# Patient Record
Sex: MALE | Race: WHITE
[De-identification: names, ages, dates, MRNs, and addresses within clinical notes are randomized per-mention and may not be internally consistent; named-entity substitution may affect disease eponyms.]

---

## 2022-03-18 PROBLEM — F90.2 ATTENTION DEFICIT HYPERACTIVITY DISORDER (ADHD), COMBINED TYPE: Status: ACTIVE | Noted: 2021-02-05

## 2022-03-19 PROBLEM — J45.40 MODERATE PERSISTENT ASTHMA WITHOUT COMPLICATION: Status: ACTIVE | Noted: 2021-02-05

## 2022-03-19 PROBLEM — F31.75 BIPOLAR DISORDER, IN PARTIAL REMISSION, MOST RECENT EPISODE DEPRESSED (HCC): Status: ACTIVE | Noted: 2021-03-11

## 2022-07-13 ENCOUNTER — TELEMEDICINE (OUTPATIENT)
Dept: INTERNAL MEDICINE CLINIC | Facility: CLINIC | Age: 27
End: 2022-07-13
Payer: COMMERCIAL

## 2022-07-13 DIAGNOSIS — F90.2 ATTENTION DEFICIT HYPERACTIVITY DISORDER (ADHD), COMBINED TYPE: ICD-10-CM

## 2022-07-13 DIAGNOSIS — U07.1 COVID: ICD-10-CM

## 2022-07-13 DIAGNOSIS — G47.9 SLEEP DISORDER: ICD-10-CM

## 2022-07-13 DIAGNOSIS — F31.75 BIPOLAR DISORDER, IN PARTIAL REMISSION, MOST RECENT EPISODE DEPRESSED (HCC): ICD-10-CM

## 2022-07-13 DIAGNOSIS — B35.9 RINGWORM: ICD-10-CM

## 2022-07-13 DIAGNOSIS — J45.40 MODERATE PERSISTENT ASTHMA WITHOUT COMPLICATION: Primary | ICD-10-CM

## 2022-07-13 PROCEDURE — 99214 OFFICE O/P EST MOD 30 MIN: CPT | Performed by: INTERNAL MEDICINE

## 2022-07-13 RX ORDER — KETOCONAZOLE 20 MG/G
CREAM TOPICAL
Qty: 30 G | Refills: 1 | Status: SHIPPED | OUTPATIENT
Start: 2022-07-13

## 2022-07-13 ASSESSMENT — ENCOUNTER SYMPTOMS
GASTROINTESTINAL NEGATIVE: 1
RESPIRATORY NEGATIVE: 1

## 2022-07-13 NOTE — LETTER
Lynn Roy MD   [facility] INTERNAL MEDICINE  [address]  [facility] [address]  Phone: [phone]  Fax: [phone]        07/13/22     Graeme Client   1995   [address] [place] [address]     To Whom It May Concern:      Graeme Client is symptom free for more then 5 days and is fine to return to work. With his permission I am able to disclose he had COVID. At times can still test positive after the infection. He has no fever, cough or other symptoms for over 6 days. Per CDC guidelines can return to work symptom free after 5 days.            Sincerely,    Lynn Roy MD

## 2022-07-13 NOTE — PROGRESS NOTES
FOLLOW UP VISIT    Subjective:    Mauricio Hernandez (: 1995) is a 27 y.o., adult,   Chief Complaint   Patient presents with    Follow-up       HPI:  Very nice patient in for follow up. He tested positive for COVID 3 weeks ago. He informed his manager and stated he was to come back 5 days after he is symptom free. He states he is symptom free and feels fine and is wanting to return to work. He also recently rescued a kitten and has ringworm. He also needs sleep study         The following portions of the patient's history were reviewed and updated as appropriate:      History reviewed. No pertinent past medical history. History reviewed. No pertinent surgical history. History reviewed. No pertinent family history. Social History     Socioeconomic History    Marital status: Life Partner     Spouse name: Not on file    Number of children: Not on file    Years of education: Not on file    Highest education level: Not on file   Occupational History    Not on file   Tobacco Use    Smoking status: Never Smoker    Smokeless tobacco: Never Used   Substance and Sexual Activity    Alcohol use: Not on file    Drug use: Not on file    Sexual activity: Not on file   Other Topics Concern    Not on file   Social History Narrative    Not on file     Social Determinants of Health     Financial Resource Strain:     Difficulty of Paying Living Expenses: Not on file   Food Insecurity:     Worried About Running Out of Food in the Last Year: Not on file    Oscar of Food in the Last Year: Not on file   Transportation Needs:     Lack of Transportation (Medical): Not on file    Lack of Transportation (Non-Medical):  Not on file   Physical Activity:     Days of Exercise per Week: Not on file    Minutes of Exercise per Session: Not on file   Stress:     Feeling of Stress : Not on file   Social Connections:     Frequency of Communication with Friends and Family: Not on file    Frequency of Social verified. This visit was conducted pursuant to the emergency declaration under the [facility], [address] [place] [address] authority and the [employer] General Act. A caregiver was present when appropriate. Ability to conduct physical exam was limited. The patient was located at home in a state where the provider was licensed to provide care. The patient and/or patient representative voiced understanding and agreement with the current diagnoses, recommendations, and possible side effects. No follow-up provider specified.       Devon White MD

## 2022-12-15 ENCOUNTER — HOSPITAL ENCOUNTER (OUTPATIENT)
Dept: ULTRASOUND IMAGING | Age: 27
Discharge: HOME | End: 2022-12-18

## 2022-12-15 ENCOUNTER — OFFICE VISIT (OUTPATIENT)
Dept: INTERNAL MEDICINE CLINIC | Facility: CLINIC | Age: 27
End: 2022-12-15
Payer: COMMERCIAL

## 2022-12-15 VITALS
WEIGHT: 165.8 LBS | HEART RATE: 75 BPM | HEIGHT: 66 IN | OXYGEN SATURATION: 99 % | TEMPERATURE: 98.5 F | BODY MASS INDEX: 26.65 KG/M2 | DIASTOLIC BLOOD PRESSURE: 68 MMHG | SYSTOLIC BLOOD PRESSURE: 94 MMHG

## 2022-12-15 DIAGNOSIS — N50.812 PAIN IN LEFT TESTICLE: ICD-10-CM

## 2022-12-15 DIAGNOSIS — G47.9 SLEEP DISORDER: ICD-10-CM

## 2022-12-15 DIAGNOSIS — R10.9 ABDOMINAL DISCOMFORT: Primary | ICD-10-CM

## 2022-12-15 DIAGNOSIS — Z00.00 ANNUAL PHYSICAL EXAM: ICD-10-CM

## 2022-12-15 DIAGNOSIS — Q99.9 GENETIC DEFECT: ICD-10-CM

## 2022-12-15 DIAGNOSIS — N50.819 TESTICULAR DISCOMFORT: ICD-10-CM

## 2022-12-15 PROCEDURE — 99395 PREV VISIT EST AGE 18-39: CPT | Performed by: INTERNAL MEDICINE

## 2022-12-15 PROCEDURE — 99214 OFFICE O/P EST MOD 30 MIN: CPT | Performed by: INTERNAL MEDICINE

## 2022-12-15 RX ORDER — LAMOTRIGINE 100 MG/1
1 TABLET ORAL
COMMUNITY
Start: 2022-11-07

## 2022-12-15 RX ORDER — DEXTROAMPHETAMINE SACCHARATE, AMPHETAMINE ASPARTATE, DEXTROAMPHETAMINE SULFATE AND AMPHETAMINE SULFATE 1.25; 1.25; 1.25; 1.25 MG/1; MG/1; MG/1; MG/1
TABLET ORAL
COMMUNITY
Start: 2022-11-15

## 2022-12-15 RX ORDER — ALBUTEROL SULFATE 90 UG/1
2 AEROSOL, METERED RESPIRATORY (INHALATION) EVERY 6 HOURS PRN
COMMUNITY
Start: 2022-03-02 | End: 2023-03-02

## 2022-12-15 ASSESSMENT — PATIENT HEALTH QUESTIONNAIRE - PHQ9
2. FEELING DOWN, DEPRESSED OR HOPELESS: 3
3. TROUBLE FALLING OR STAYING ASLEEP: 2
5. POOR APPETITE OR OVEREATING: 2
SUM OF ALL RESPONSES TO PHQ9 QUESTIONS 1 & 2: 5
SUM OF ALL RESPONSES TO PHQ QUESTIONS 1-9: 15
7. TROUBLE CONCENTRATING ON THINGS, SUCH AS READING THE NEWSPAPER OR WATCHING TELEVISION: 2
SUM OF ALL RESPONSES TO PHQ QUESTIONS 1-9: 15
6. FEELING BAD ABOUT YOURSELF - OR THAT YOU ARE A FAILURE OR HAVE LET YOURSELF OR YOUR FAMILY DOWN: 0
SUM OF ALL RESPONSES TO PHQ QUESTIONS 1-9: 15
9. THOUGHTS THAT YOU WOULD BE BETTER OFF DEAD, OR OF HURTING YOURSELF: 0
8. MOVING OR SPEAKING SO SLOWLY THAT OTHER PEOPLE COULD HAVE NOTICED. OR THE OPPOSITE, BEING SO FIGETY OR RESTLESS THAT YOU HAVE BEEN MOVING AROUND A LOT MORE THAN USUAL: 3
4. FEELING TIRED OR HAVING LITTLE ENERGY: 1
1. LITTLE INTEREST OR PLEASURE IN DOING THINGS: 2
SUM OF ALL RESPONSES TO PHQ QUESTIONS 1-9: 15
10. IF YOU CHECKED OFF ANY PROBLEMS, HOW DIFFICULT HAVE THESE PROBLEMS MADE IT FOR YOU TO DO YOUR WORK, TAKE CARE OF THINGS AT HOME, OR GET ALONG WITH OTHER PEOPLE: 2

## 2022-12-15 ASSESSMENT — ENCOUNTER SYMPTOMS
CONSTIPATION: 0
CHEST TIGHTNESS: 0
SHORTNESS OF BREATH: 0
BACK PAIN: 0
RHINORRHEA: 0
ABDOMINAL PAIN: 0
WHEEZING: 0
EYE REDNESS: 0
EYE DISCHARGE: 0
RESPIRATORY NEGATIVE: 1
SINUS PRESSURE: 0
EYE ITCHING: 0
ABDOMINAL DISTENTION: 0
DIARRHEA: 0
NAUSEA: 0
RECTAL PAIN: 0
CHOKING: 0
ALLERGIC/IMMUNOLOGIC NEGATIVE: 1
BLOOD IN STOOL: 0
COLOR CHANGE: 0
STRIDOR: 0
COUGH: 0
GASTROINTESTINAL NEGATIVE: 1
EYE PAIN: 0
EYES NEGATIVE: 1
ANAL BLEEDING: 0
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
## 2023-02-06 ENCOUNTER — OFFICE VISIT (OUTPATIENT)
Dept: INTERNAL MEDICINE CLINIC | Facility: CLINIC | Age: 28
End: 2023-02-06
Payer: COMMERCIAL

## 2023-02-06 VITALS
BODY MASS INDEX: 26.87 KG/M2 | WEIGHT: 167.2 LBS | OXYGEN SATURATION: 98 % | SYSTOLIC BLOOD PRESSURE: 118 MMHG | HEIGHT: 66 IN | HEART RATE: 55 BPM | DIASTOLIC BLOOD PRESSURE: 72 MMHG

## 2023-02-06 DIAGNOSIS — F31.75 BIPOLAR DISORDER, IN PARTIAL REMISSION, MOST RECENT EPISODE DEPRESSED (HCC): ICD-10-CM

## 2023-02-06 DIAGNOSIS — M54.9 BACK PAIN, UNSPECIFIED BACK LOCATION, UNSPECIFIED BACK PAIN LATERALITY, UNSPECIFIED CHRONICITY: Primary | ICD-10-CM

## 2023-02-06 PROCEDURE — 99214 OFFICE O/P EST MOD 30 MIN: CPT | Performed by: INTERNAL MEDICINE

## 2023-02-06 RX ORDER — CARIPRAZINE 1.5 MG/1
1.5 CAPSULE, GELATIN COATED ORAL DAILY
COMMUNITY
Start: 2023-01-27

## 2023-02-06 RX ORDER — ALBUTEROL SULFATE 90 UG/1
2 AEROSOL, METERED RESPIRATORY (INHALATION) EVERY 6 HOURS PRN
Qty: 18 G | Refills: 2 | Status: SHIPPED | OUTPATIENT
Start: 2023-02-06 | End: 2024-02-06

## 2023-02-06 ASSESSMENT — ENCOUNTER SYMPTOMS
ABDOMINAL PAIN: 1
RESPIRATORY NEGATIVE: 1
BACK PAIN: 1

## 2023-02-06 ASSESSMENT — PATIENT HEALTH QUESTIONNAIRE - PHQ9
10. IF YOU CHECKED OFF ANY PROBLEMS, HOW DIFFICULT HAVE THESE PROBLEMS MADE IT FOR YOU TO DO YOUR WORK, TAKE CARE OF THINGS AT HOME, OR GET ALONG WITH OTHER PEOPLE: 1
SUM OF ALL RESPONSES TO PHQ QUESTIONS 1-9: 14
3. TROUBLE FALLING OR STAYING ASLEEP: 1
SUM OF ALL RESPONSES TO PHQ QUESTIONS 1-9: 14
7. TROUBLE CONCENTRATING ON THINGS, SUCH AS READING THE NEWSPAPER OR WATCHING TELEVISION: 3
4. FEELING TIRED OR HAVING LITTLE ENERGY: 1
SUM OF ALL RESPONSES TO PHQ QUESTIONS 1-9: 13
1. LITTLE INTEREST OR PLEASURE IN DOING THINGS: 1
5. POOR APPETITE OR OVEREATING: 3
6. FEELING BAD ABOUT YOURSELF - OR THAT YOU ARE A FAILURE OR HAVE LET YOURSELF OR YOUR FAMILY DOWN: 3
8. MOVING OR SPEAKING SO SLOWLY THAT OTHER PEOPLE COULD HAVE NOTICED. OR THE OPPOSITE, BEING SO FIGETY OR RESTLESS THAT YOU HAVE BEEN MOVING AROUND A LOT MORE THAN USUAL: 0
SUM OF ALL RESPONSES TO PHQ9 QUESTIONS 1 & 2: 2
SUM OF ALL RESPONSES TO PHQ QUESTIONS 1-9: 14
9. THOUGHTS THAT YOU WOULD BE BETTER OFF DEAD, OR OF HURTING YOURSELF: 1
2. FEELING DOWN, DEPRESSED OR HOPELESS: 1
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
## 2024-01-03 ENCOUNTER — TELEMEDICINE (OUTPATIENT)
Dept: PRIMARY CARE CLINIC | Facility: CLINIC | Age: 29
End: 2024-01-03
Payer: COMMERCIAL

## 2024-01-03 DIAGNOSIS — R10.84 GENERALIZED ABDOMINAL PAIN: ICD-10-CM

## 2024-01-03 DIAGNOSIS — R10.84 GENERALIZED ABDOMINAL PAIN: Primary | ICD-10-CM

## 2024-01-03 LAB
ALBUMIN SERPL-MCNC: 4.3 G/DL (ref 3.5–5)
ALBUMIN/GLOB SERPL: 1.8 (ref 0.4–1.6)
ALP SERPL-CCNC: 70 U/L (ref 50–136)
ALT SERPL-CCNC: 54 U/L (ref 12–65)
ANION GAP SERPL CALC-SCNC: 1 MMOL/L (ref 2–11)
AST SERPL-CCNC: 27 U/L (ref 15–37)
BASOPHILS # BLD: 0 K/UL (ref 0–0.2)
BASOPHILS NFR BLD: 1 % (ref 0–2)
BILIRUB SERPL-MCNC: 0.5 MG/DL (ref 0.2–1.1)
BUN SERPL-MCNC: 13 MG/DL (ref 6–23)
CALCIUM SERPL-MCNC: 9.2 MG/DL (ref 8.3–10.4)
CHLORIDE SERPL-SCNC: 110 MMOL/L (ref 103–113)
CO2 SERPL-SCNC: 28 MMOL/L (ref 21–32)
CREAT SERPL-MCNC: 0.8 MG/DL (ref 0.8–1.5)
DIFFERENTIAL METHOD BLD: NORMAL
EOSINOPHIL # BLD: 0.3 K/UL (ref 0–0.8)
EOSINOPHIL NFR BLD: 6 % (ref 0.5–7.8)
ERYTHROCYTE [DISTWIDTH] IN BLOOD BY AUTOMATED COUNT: 11.9 % (ref 11.9–14.6)
GLOBULIN SER CALC-MCNC: 2.4 G/DL (ref 2.8–4.5)
GLUCOSE SERPL-MCNC: 78 MG/DL (ref 65–100)
HCT VFR BLD AUTO: 45.1 % (ref 41.1–50.3)
HGB BLD-MCNC: 15.4 G/DL (ref 13.6–17.2)
IMM GRANULOCYTES # BLD AUTO: 0 K/UL (ref 0–0.5)
IMM GRANULOCYTES NFR BLD AUTO: 0 % (ref 0–5)
LIPASE SERPL-CCNC: 702 U/L (ref 73–393)
LYMPHOCYTES # BLD: 1.9 K/UL (ref 0.5–4.6)
LYMPHOCYTES NFR BLD: 39 % (ref 13–44)
MCH RBC QN AUTO: 32.9 PG (ref 26.1–32.9)
MCHC RBC AUTO-ENTMCNC: 34.1 G/DL (ref 31.4–35)
MCV RBC AUTO: 96.4 FL (ref 82–102)
MONOCYTES # BLD: 0.5 K/UL (ref 0.1–1.3)
MONOCYTES NFR BLD: 10 % (ref 4–12)
NEUTS SEG # BLD: 2.1 K/UL (ref 1.7–8.2)
NEUTS SEG NFR BLD: 44 % (ref 43–78)
NRBC # BLD: 0 K/UL (ref 0–0.2)
PLATELET # BLD AUTO: 245 K/UL (ref 150–450)
PMV BLD AUTO: 11.5 FL (ref 9.4–12.3)
POTASSIUM SERPL-SCNC: 4.3 MMOL/L (ref 3.5–5.1)
PROT SERPL-MCNC: 6.7 G/DL (ref 6.3–8.2)
RBC # BLD AUTO: 4.68 M/UL (ref 4.23–5.6)
SODIUM SERPL-SCNC: 139 MMOL/L (ref 136–146)
WBC # BLD AUTO: 4.8 K/UL (ref 4.3–11.1)

## 2024-01-03 PROCEDURE — 99214 OFFICE O/P EST MOD 30 MIN: CPT | Performed by: INTERNAL MEDICINE

## 2024-01-03 SDOH — ECONOMIC STABILITY: FOOD INSECURITY: WITHIN THE PAST 12 MONTHS, YOU WORRIED THAT YOUR FOOD WOULD RUN OUT BEFORE YOU GOT MONEY TO BUY MORE.: SOMETIMES TRUE

## 2024-01-03 SDOH — ECONOMIC STABILITY: TRANSPORTATION INSECURITY
IN THE PAST 12 MONTHS, HAS LACK OF TRANSPORTATION KEPT YOU FROM MEETINGS, WORK, OR FROM GETTING THINGS NEEDED FOR DAILY LIVING?: NO

## 2024-01-03 SDOH — ECONOMIC STABILITY: FOOD INSECURITY: WITHIN THE PAST 12 MONTHS, THE FOOD YOU BOUGHT JUST DIDN'T LAST AND YOU DIDN'T HAVE MONEY TO GET MORE.: NEVER TRUE

## 2024-01-03 SDOH — ECONOMIC STABILITY: INCOME INSECURITY: HOW HARD IS IT FOR YOU TO PAY FOR THE VERY BASICS LIKE FOOD, HOUSING, MEDICAL CARE, AND HEATING?: NOT VERY HARD

## 2024-01-03 SDOH — ECONOMIC STABILITY: HOUSING INSECURITY
IN THE LAST 12 MONTHS, WAS THERE A TIME WHEN YOU DID NOT HAVE A STEADY PLACE TO SLEEP OR SLEPT IN A SHELTER (INCLUDING NOW)?: NO

## 2024-01-03 ASSESSMENT — ENCOUNTER SYMPTOMS
CONSTIPATION: 0
ABDOMINAL PAIN: 1
NAUSEA: 0
FLATUS: 0
HEMATOCHEZIA: 0
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
## 2024-01-04 ENCOUNTER — HOSPITAL ENCOUNTER (EMERGENCY)
Age: 29
Discharge: HOME | End: 2024-01-04
Payer: COMMERCIAL

## 2024-01-04 ENCOUNTER — TELEPHONE (OUTPATIENT)
Dept: PRIMARY CARE CLINIC | Facility: CLINIC | Age: 29
End: 2024-01-04

## 2024-01-04 ENCOUNTER — APPOINTMENT (OUTPATIENT)
Dept: CT IMAGING | Age: 29
End: 2024-01-04
Payer: COMMERCIAL

## 2024-01-04 VITALS
WEIGHT: 171 LBS | OXYGEN SATURATION: 98 % | TEMPERATURE: 98.4 F | SYSTOLIC BLOOD PRESSURE: 104 MMHG | BODY MASS INDEX: 27.48 KG/M2 | RESPIRATION RATE: 18 BRPM | HEIGHT: 66 IN | DIASTOLIC BLOOD PRESSURE: 58 MMHG | HEART RATE: 54 BPM

## 2024-01-04 DIAGNOSIS — R10.10 PAIN OF UPPER ABDOMEN: Primary | ICD-10-CM

## 2024-01-04 DIAGNOSIS — R11.2 NAUSEA AND VOMITING, UNSPECIFIED VOMITING TYPE: ICD-10-CM

## 2024-01-04 LAB
ALBUMIN SERPL-MCNC: 4.4 G/DL (ref 3.5–5)
ALBUMIN/GLOB SERPL: 1.5 (ref 0.4–1.6)
ALP SERPL-CCNC: 77 U/L (ref 45–117)
ALT SERPL-CCNC: 38 U/L (ref 13–61)
ANION GAP SERPL CALC-SCNC: 11 MMOL/L (ref 2–11)
APPEARANCE UR: CLEAR
AST SERPL-CCNC: 29 U/L (ref 15–37)
BASOPHILS # BLD: 0.1 K/UL (ref 0–0.2)
BASOPHILS NFR BLD: 1 % (ref 0–2)
BILIRUB SERPL-MCNC: 0.5 MG/DL (ref 0.2–1.1)
BILIRUB UR QL: NEGATIVE
BUN SERPL-MCNC: 13 MG/DL (ref 6–23)
CALCIUM SERPL-MCNC: 9.3 MG/DL (ref 8.3–10.4)
CHLORIDE SERPL-SCNC: 103 MMOL/L (ref 98–107)
CO2 SERPL-SCNC: 24 MMOL/L (ref 21–32)
COLOR UR: YELLOW
CREAT SERPL-MCNC: 0.85 MG/DL (ref 0.8–1.5)
DIFFERENTIAL METHOD BLD: ABNORMAL
EOSINOPHIL # BLD: 0.2 K/UL (ref 0–0.8)
EOSINOPHIL NFR BLD: 2 % (ref 0.5–7.8)
ERYTHROCYTE [DISTWIDTH] IN BLOOD BY AUTOMATED COUNT: 11.6 % (ref 11.9–14.6)
GLOBULIN SER CALC-MCNC: 3 G/DL (ref 2.8–4.5)
GLUCOSE SERPL-MCNC: 104 MG/DL (ref 65–100)
GLUCOSE UR STRIP.AUTO-MCNC: NEGATIVE MG/DL
HCT VFR BLD AUTO: 44 % (ref 41.1–50.3)
HGB BLD-MCNC: 15.7 G/DL (ref 13.6–17.2)
HGB UR QL STRIP: NEGATIVE
IMM GRANULOCYTES # BLD AUTO: 0 K/UL (ref 0–0.5)
IMM GRANULOCYTES NFR BLD AUTO: 0 % (ref 0–5)
KETONES UR QL STRIP.AUTO: NEGATIVE MG/DL
LEUKOCYTE ESTERASE UR QL STRIP.AUTO: NEGATIVE
LIPASE SERPL-CCNC: 38 U/L (ref 13–60)
LYMPHOCYTES # BLD: 2 K/UL (ref 0.5–4.6)
LYMPHOCYTES NFR BLD: 25 % (ref 13–44)
MCH RBC QN AUTO: 33.3 PG (ref 26.1–32.9)
MCHC RBC AUTO-ENTMCNC: 35.7 G/DL (ref 31.4–35)
MCV RBC AUTO: 93.4 FL (ref 82–102)
MONOCYTES # BLD: 0.6 K/UL (ref 0.1–1.3)
MONOCYTES NFR BLD: 7 % (ref 4–12)
NEUTS SEG # BLD: 5.2 K/UL (ref 1.7–8.2)
NEUTS SEG NFR BLD: 64 % (ref 43–78)
NITRITE UR QL STRIP.AUTO: NEGATIVE
NRBC # BLD: 0 K/UL (ref 0–0.2)
PH UR STRIP: 6.5 (ref 5–9)
PLATELET # BLD AUTO: 267 K/UL (ref 150–450)
PMV BLD AUTO: 11.1 FL (ref 9.4–12.3)
POTASSIUM SERPL-SCNC: 4.3 MMOL/L (ref 3.5–5.1)
PROT SERPL-MCNC: 7.4 G/DL (ref 6.4–8.2)
PROT UR STRIP-MCNC: NEGATIVE MG/DL
RBC # BLD AUTO: 4.71 M/UL (ref 4.23–5.6)
SODIUM SERPL-SCNC: 138 MMOL/L (ref 133–143)
SP GR UR REFRACTOMETRY: 1.02 (ref 1–1.02)
UREA BREATH TEST QL: NEGATIVE
UROBILINOGEN UR QL STRIP.AUTO: 0.2 EU/DL (ref 0.2–1)
WBC # BLD AUTO: 8.1 K/UL (ref 4.3–11.1)

## 2024-01-04 PROCEDURE — 6360000002 HC RX W HCPCS: Performed by: PHYSICIAN ASSISTANT

## 2024-01-04 PROCEDURE — 6360000004 HC RX CONTRAST MEDICATION: Performed by: PHYSICIAN ASSISTANT

## 2024-01-04 PROCEDURE — 96375 TX/PRO/DX INJ NEW DRUG ADDON: CPT

## 2024-01-04 PROCEDURE — 85025 COMPLETE CBC W/AUTO DIFF WBC: CPT

## 2024-01-04 PROCEDURE — 83690 ASSAY OF LIPASE: CPT

## 2024-01-04 PROCEDURE — A4216 STERILE WATER/SALINE, 10 ML: HCPCS | Performed by: PHYSICIAN ASSISTANT

## 2024-01-04 PROCEDURE — 96374 THER/PROPH/DIAG INJ IV PUSH: CPT

## 2024-01-04 PROCEDURE — 80053 COMPREHEN METABOLIC PANEL: CPT

## 2024-01-04 PROCEDURE — 81003 URINALYSIS AUTO W/O SCOPE: CPT

## 2024-01-04 PROCEDURE — 74177 CT ABD & PELVIS W/CONTRAST: CPT

## 2024-01-04 PROCEDURE — 99285 EMERGENCY DEPT VISIT HI MDM: CPT

## 2024-01-04 PROCEDURE — 6370000000 HC RX 637 (ALT 250 FOR IP)

## 2024-01-04 PROCEDURE — 6360000002 HC RX W HCPCS

## 2024-01-04 PROCEDURE — 2580000003 HC RX 258: Performed by: PHYSICIAN ASSISTANT

## 2024-01-04 PROCEDURE — 2500000003 HC RX 250 WO HCPCS: Performed by: PHYSICIAN ASSISTANT

## 2024-01-04 RX ORDER — ONDANSETRON 2 MG/ML
4 INJECTION INTRAMUSCULAR; INTRAVENOUS
Status: COMPLETED | OUTPATIENT
Start: 2024-01-04 | End: 2024-01-04

## 2024-01-04 RX ORDER — ONDANSETRON 4 MG/1
4 TABLET, FILM COATED ORAL EVERY 8 HOURS PRN
Qty: 21 TABLET | Refills: 0 | Status: SHIPPED | OUTPATIENT
Start: 2024-01-04 | End: 2024-01-11

## 2024-01-04 RX ORDER — PANTOPRAZOLE SODIUM 40 MG/1
40 TABLET, DELAYED RELEASE ORAL
Qty: 30 TABLET | Refills: 0 | Status: SHIPPED | OUTPATIENT
Start: 2024-01-04

## 2024-01-04 RX ORDER — MAGNESIUM HYDROXIDE/ALUMINUM HYDROXICE/SIMETHICONE 120; 1200; 1200 MG/30ML; MG/30ML; MG/30ML
30 SUSPENSION ORAL
Status: COMPLETED | OUTPATIENT
Start: 2024-01-04 | End: 2024-01-04

## 2024-01-04 RX ORDER — SUCRALFATE 1 G/1
1 TABLET ORAL 4 TIMES DAILY
Qty: 28 TABLET | Refills: 0 | Status: SHIPPED | OUTPATIENT
Start: 2024-01-04 | End: 2024-01-11

## 2024-01-04 RX ORDER — DICYCLOMINE HYDROCHLORIDE 10 MG/1
CAPSULE ORAL
Status: COMPLETED
Start: 2024-01-04 | End: 2024-01-04

## 2024-01-04 RX ORDER — DICYCLOMINE HCL 20 MG
20 TABLET ORAL 4 TIMES DAILY PRN
Qty: 28 TABLET | Refills: 0 | Status: SHIPPED | OUTPATIENT
Start: 2024-01-04 | End: 2024-01-11

## 2024-01-04 RX ORDER — SODIUM CHLORIDE 9 MG/ML
INJECTION INTRAVENOUS
Status: DISCONTINUED
Start: 2024-01-04 | End: 2024-01-04 | Stop reason: HOSPADM

## 2024-01-04 RX ORDER — MORPHINE SULFATE 4 MG/ML
4 INJECTION INTRAVENOUS ONCE
Status: COMPLETED | OUTPATIENT
Start: 2024-01-04 | End: 2024-01-04

## 2024-01-04 RX ORDER — 0.9 % SODIUM CHLORIDE 0.9 %
1000 INTRAVENOUS SOLUTION INTRAVENOUS
Status: COMPLETED | OUTPATIENT
Start: 2024-01-04 | End: 2024-01-04

## 2024-01-04 RX ORDER — LIDOCAINE HYDROCHLORIDE 20 MG/ML
SOLUTION OROPHARYNGEAL
Status: COMPLETED
Start: 2024-01-04 | End: 2024-01-04

## 2024-01-04 RX ORDER — KETOROLAC TROMETHAMINE 30 MG/ML
INJECTION, SOLUTION INTRAMUSCULAR; INTRAVENOUS
Status: COMPLETED
Start: 2024-01-04 | End: 2024-01-04

## 2024-01-04 RX ORDER — FAMOTIDINE 10 MG/ML
INJECTION, SOLUTION INTRAVENOUS
Status: DISCONTINUED
Start: 2024-01-04 | End: 2024-01-04 | Stop reason: HOSPADM

## 2024-01-04 RX ORDER — LIDOCAINE HYDROCHLORIDE 20 MG/ML
15 SOLUTION OROPHARYNGEAL
Status: COMPLETED | OUTPATIENT
Start: 2024-01-04 | End: 2024-01-04

## 2024-01-04 RX ORDER — DICYCLOMINE HYDROCHLORIDE 10 MG/1
20 CAPSULE ORAL
Status: COMPLETED | OUTPATIENT
Start: 2024-01-04 | End: 2024-01-04

## 2024-01-04 RX ORDER — KETOROLAC TROMETHAMINE 30 MG/ML
30 INJECTION, SOLUTION INTRAMUSCULAR; INTRAVENOUS
Status: COMPLETED | OUTPATIENT
Start: 2024-01-04 | End: 2024-01-04

## 2024-01-04 RX ORDER — MAGNESIUM HYDROXIDE/ALUMINUM HYDROXICE/SIMETHICONE 120; 1200; 1200 MG/30ML; MG/30ML; MG/30ML
SUSPENSION ORAL
Status: COMPLETED
Start: 2024-01-04 | End: 2024-01-04

## 2024-01-04 RX ADMIN — SODIUM CHLORIDE 1000 ML: 9 INJECTION, SOLUTION INTRAVENOUS at 16:22

## 2024-01-04 RX ADMIN — ALUMINUM HYDROXIDE, MAGNESIUM HYDROXIDE, AND SIMETHICONE 30 ML: 200; 200; 20 SUSPENSION ORAL at 17:37

## 2024-01-04 RX ADMIN — IOPAMIDOL 100 ML: 755 INJECTION, SOLUTION INTRAVENOUS at 14:36

## 2024-01-04 RX ADMIN — MORPHINE SULFATE 4 MG: 4 INJECTION, SOLUTION INTRAMUSCULAR; INTRAVENOUS at 16:24

## 2024-01-04 RX ADMIN — LIDOCAINE HYDROCHLORIDE 15 ML: 20 SOLUTION OROPHARYNGEAL at 17:36

## 2024-01-04 RX ADMIN — KETOROLAC TROMETHAMINE 30 MG: 30 INJECTION, SOLUTION INTRAMUSCULAR at 17:32

## 2024-01-04 RX ADMIN — ONDANSETRON 4 MG: 2 INJECTION INTRAMUSCULAR; INTRAVENOUS at 16:24

## 2024-01-04 RX ADMIN — MAGNESIUM HYDROXIDE/ALUMINUM HYDROXICE/SIMETHICONE 30 ML: 120; 1200; 1200 SUSPENSION ORAL at 17:37

## 2024-01-04 RX ADMIN — KETOROLAC TROMETHAMINE 30 MG: 30 INJECTION, SOLUTION INTRAMUSCULAR; INTRAVENOUS at 17:32

## 2024-01-04 RX ADMIN — FAMOTIDINE 20 MG: 10 INJECTION, SOLUTION INTRAVENOUS at 17:39

## 2024-01-04 RX ADMIN — DICYCLOMINE HYDROCHLORIDE 20 MG: 10 CAPSULE ORAL at 17:35

## 2024-01-04 RX ADMIN — LIDOCAINE HYDROCHLORIDE 15 ML: 20 SOLUTION ORAL at 17:36

## 2024-01-04 ASSESSMENT — PAIN SCALES - GENERAL
PAINLEVEL_OUTOF10: 9
PAINLEVEL_OUTOF10: 6
PAINLEVEL_OUTOF10: 5
PAINLEVEL_OUTOF10: 8
PAINLEVEL_OUTOF10: 6

## 2024-01-04 ASSESSMENT — LIFESTYLE VARIABLES
HOW MANY STANDARD DRINKS CONTAINING ALCOHOL DO YOU HAVE ON A TYPICAL DAY: 1 OR 2
HOW OFTEN DO YOU HAVE A DRINK CONTAINING ALCOHOL: MONTHLY OR LESS

## 2024-01-04 ASSESSMENT — PAIN DESCRIPTION - DESCRIPTORS
DESCRIPTORS: STABBING
DESCRIPTORS: STABBING

## 2024-01-04 ASSESSMENT — ENCOUNTER SYMPTOMS
BACK PAIN: 1
DIARRHEA: 1
NAUSEA: 1
VOMITING: 1
ABDOMINAL PAIN: 1

## 2024-01-04 ASSESSMENT — PAIN DESCRIPTION - ORIENTATION
ORIENTATION: RIGHT

## 2024-01-04 ASSESSMENT — PAIN DESCRIPTION - LOCATION
LOCATION: ABDOMEN
LOCATION: FLANK
LOCATION: ABDOMEN

## 2024-01-04 ASSESSMENT — PAIN - FUNCTIONAL ASSESSMENT: PAIN_FUNCTIONAL_ASSESSMENT: 0-10

## 2024-01-04 NOTE — TELEPHONE ENCOUNTER
Patient returned my phone call and I went over his lab results with him. He did let me know that he has not gotten his US, they have him scheduled for one on 1/9/24. But he did end up going to the ER today, because he states his pain had gotten worse. He stated that he did tell them at the ER that his doctor does want him to have a US, but they did a CT scan on him a marilu blood work.

## 2024-01-04 NOTE — ED NOTES
I have reviewed discharge instructions with the patient.  The patient verbalized understanding.    Patient left ED via Discharge Method: ambulatory to Home with self    Opportunity for questions and clarification provided.       Patient given 4 scripts.         To continue your aftercare when you leave the hospital, you may receive an automated call from our care team to check in on how you are doing.  This is a free service and part of our promise to provide the best care and service to meet your aftercare needs.” If you have questions, or wish to unsubscribe from this service please call [phone].  Thank you for Choosing our [facility] Emergency Department.

## 2024-01-04 NOTE — TELEPHONE ENCOUNTER
----- Message from Katherine Ariza MD sent at 1/4/2024 12:16 PM EST -----  Patients lipase is high could be due to his gallbladder has he gotten US yet ? If worse pain go to ER will rehceck this in a week at his visit

## 2024-01-04 NOTE — DISCHARGE INSTRUCTIONS
Labs and imaging here today reassuring.  Will start you on medications to try and help treat your abdominal pain.  Encouraged follow-up with your family doctor.  Return to the ED as needed for new or worsening symptoms.

## 2024-01-04 NOTE — ED TRIAGE NOTES
Pt ambulatory to triage with steady gait. Pt c/o R upper and lower abdominal pain that radiates to his back x1 week. Pt states \"I have been trying to go through by primary and I had blood work today and imaging scheduled for next week, but the pain just keep getting worse\". Pt also c/o nausea. Pt denies any vomiting or diarrhea. Pt verbalizes increase in pain with eating Pt in NAD during triage.

## 2024-01-04 NOTE — ED PROVIDER NOTES
[facility]  Emergency Department    DISPOSITION Decision To Discharge 01/04/2024 05:56:26 PM       ICD-10-CM    1. Pain of upper abdomen  R10.10       2. Nausea and vomiting, unspecified vomiting type  R11.2         ED Course     ED Course as of 01/04/24 1759   u Jan 04, 2024   1424 Patient is a 28-year-old who presents to the facility today with complaint of right upper quadrant and epigastric abdominal pain radiating to the back.  Reports that symptoms been going on for the past week and a half but intensifying. Cannot keep anything down.  will obtain labs and CT imaging of the abdomen.  Fluids and nausea medicine ordered at this time as well as pain medicine. [TT]   1527 CT ABDOMEN PELVIS W IV CONTRAST Additional Contrast? None  IMPRESSION:  No acute pathology   [TT]   1751 Patient had no noted improvement of pain following the morphine.  As such, GI cocktail was given as well as some Toradol and Bentyl.  Patient now reporting improvement of symptoms.  Given reassuring labs and imaging here today will plan to start patient on medications targeting more of a gastritis type of cause of symptoms.  Will encourage follow-up with his doctor.  Discussed return precautions to the ED which the patient reports understanding.  Patient is agreeable with this treatment plan as discussed.  Patient stable for discharge. [TT]      ED Course User Index  [TT] Sebastián Eubanks PA-C     Complexity of Problems Addressed:  1 acute illness    Data Reviewed and Analyzed:  Category 1:   I reviewed external records: provider visit note from outside specialist.  I reviewed external records: previous lab results from outside ED.  I ordered each unique test.  I interpreted the results of each unique test.      Category 2:   I interpreted the CT Scan no evidence of pneumoperitoneum.  I interpreted the labs.    Category 3: Discussion of management or test interpretation.  See ED Course above    Is this patient to be

## 2024-01-05 ENCOUNTER — TELEPHONE (OUTPATIENT)
Dept: PRIMARY CARE CLINIC | Facility: CLINIC | Age: 29
End: 2024-01-05

## 2024-01-05 NOTE — TELEPHONE ENCOUNTER
Spoke with the patient about his lab results and he states he is still feeling bad. I told him we can make him a ER follow up.

## 2024-01-05 NOTE — TELEPHONE ENCOUNTER
----- Message from Katherine Ariza MD sent at 1/4/2024  5:56 PM EST -----  Please inform patient H Pylori is negative

## 2024-01-08 ENCOUNTER — TELEPHONE (OUTPATIENT)
Dept: PRIMARY CARE CLINIC | Facility: CLINIC | Age: 29
End: 2024-01-08

## 2024-01-08 NOTE — TELEPHONE ENCOUNTER
Pt notified to keep ABD US sched for 1/9/24 and verbalized understanding  Pt reports decline with acid reflux but chronic pain, after eating, or while lying down 4-5/10 scale and does not feel like \"...medications are helping...\". Pt decided, during call, to wait until results from US to sched VV with PCP to discuss both results and medications

## 2024-01-08 NOTE — TELEPHONE ENCOUNTER
Please call regarding continued pain of the abdominal. Patient is aware of her US appointment scheduled.

## 2024-02-05 ENCOUNTER — TELEMEDICINE (OUTPATIENT)
Dept: PRIMARY CARE CLINIC | Facility: CLINIC | Age: 29
End: 2024-02-05
Payer: COMMERCIAL

## 2024-02-05 DIAGNOSIS — J45.40 MODERATE PERSISTENT ASTHMA WITHOUT COMPLICATION: ICD-10-CM

## 2024-02-05 DIAGNOSIS — K21.9 GASTROESOPHAGEAL REFLUX DISEASE WITHOUT ESOPHAGITIS: ICD-10-CM

## 2024-02-05 DIAGNOSIS — R10.84 GENERALIZED ABDOMINAL PAIN: Primary | ICD-10-CM

## 2024-02-05 PROCEDURE — 99214 OFFICE O/P EST MOD 30 MIN: CPT | Performed by: INTERNAL MEDICINE

## 2024-02-05 RX ORDER — OLANZAPINE AND SAMIDORPHAN L-MALATE 5; 10 MG/1; MG/1
TABLET, FILM COATED ORAL
COMMUNITY

## 2024-02-05 RX ORDER — PANTOPRAZOLE SODIUM 40 MG/1
40 TABLET, DELAYED RELEASE ORAL
Qty: 90 TABLET | Refills: 1 | Status: SHIPPED | OUTPATIENT
Start: 2024-02-05

## 2024-02-05 RX ORDER — ALBUTEROL SULFATE 90 UG/1
2 AEROSOL, METERED RESPIRATORY (INHALATION) EVERY 6 HOURS PRN
Qty: 18 G | Refills: 2 | Status: SHIPPED | OUTPATIENT
Start: 2024-02-05 | End: 2025-02-04

## 2024-02-05 ASSESSMENT — ENCOUNTER SYMPTOMS
STRIDOR: 0
FLATUS: 0
ABDOMINAL PAIN: 1
CHOKING: 0
HEARTBURN: 1
SORE THROAT: 0
DIARRHEA: 0
HEMATOCHEZIA: 0
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
## 2024-03-25 ENCOUNTER — TELEMEDICINE (OUTPATIENT)
Dept: PRIMARY CARE CLINIC | Facility: CLINIC | Age: 29
End: 2024-03-25
Payer: COMMERCIAL

## 2024-03-25 ENCOUNTER — PATIENT MESSAGE (OUTPATIENT)
Dept: PRIMARY CARE CLINIC | Facility: CLINIC | Age: 29
End: 2024-03-25

## 2024-03-25 DIAGNOSIS — F31.75 BIPOLAR DISORDER, IN PARTIAL REMISSION, MOST RECENT EPISODE DEPRESSED (HCC): ICD-10-CM

## 2024-03-25 DIAGNOSIS — L30.9 ACUTE ECZEMA: Primary | ICD-10-CM

## 2024-03-25 PROCEDURE — 99214 OFFICE O/P EST MOD 30 MIN: CPT | Performed by: INTERNAL MEDICINE
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
## 2024-08-10 DIAGNOSIS — K21.9 GASTROESOPHAGEAL REFLUX DISEASE WITHOUT ESOPHAGITIS: ICD-10-CM

## 2024-08-10 DIAGNOSIS — R10.84 GENERALIZED ABDOMINAL PAIN: ICD-10-CM

## 2024-08-12 RX ORDER — PANTOPRAZOLE SODIUM 40 MG/1
40 TABLET, DELAYED RELEASE ORAL
Qty: 90 TABLET | Refills: 1 | Status: SHIPPED | OUTPATIENT
Start: 2024-08-12

## 2024-08-21 ENCOUNTER — OFFICE VISIT (OUTPATIENT)
Dept: PRIMARY CARE CLINIC | Facility: CLINIC | Age: 29
End: 2024-08-21
Payer: COMMERCIAL

## 2024-08-21 VITALS
DIASTOLIC BLOOD PRESSURE: 64 MMHG | SYSTOLIC BLOOD PRESSURE: 110 MMHG | OXYGEN SATURATION: 98 % | HEART RATE: 66 BPM | WEIGHT: 167 LBS | BODY MASS INDEX: 26.84 KG/M2 | HEIGHT: 66 IN

## 2024-08-21 DIAGNOSIS — Z00.00 ANNUAL PHYSICAL EXAM: Primary | ICD-10-CM

## 2024-08-21 DIAGNOSIS — Z00.00 ENCOUNTER FOR WELL ADULT EXAM WITHOUT ABNORMAL FINDINGS: ICD-10-CM

## 2024-08-21 DIAGNOSIS — N52.9 ERECTILE DYSFUNCTION, UNSPECIFIED ERECTILE DYSFUNCTION TYPE: ICD-10-CM

## 2024-08-21 DIAGNOSIS — Z00.00 ANNUAL PHYSICAL EXAM: ICD-10-CM

## 2024-08-21 DIAGNOSIS — Z11.3 SCREEN FOR STD (SEXUALLY TRANSMITTED DISEASE): ICD-10-CM

## 2024-08-21 LAB
ALBUMIN SERPL-MCNC: 4.1 G/DL (ref 3.5–5)
ALBUMIN/GLOB SERPL: 1.6 (ref 1–1.9)
ALP SERPL-CCNC: 71 U/L (ref 40–129)
ALT SERPL-CCNC: 21 U/L (ref 12–65)
ANION GAP SERPL CALC-SCNC: 9 MMOL/L (ref 9–18)
APPEARANCE UR: CLEAR
AST SERPL-CCNC: 23 U/L (ref 15–37)
BACTERIA URNS QL MICRO: NEGATIVE /HPF
BASOPHILS # BLD: 0.1 K/UL (ref 0–0.2)
BASOPHILS NFR BLD: 1 % (ref 0–2)
BILIRUB SERPL-MCNC: 0.3 MG/DL (ref 0–1.2)
BILIRUB UR QL: NEGATIVE
BUN SERPL-MCNC: 15 MG/DL (ref 6–23)
CALCIUM SERPL-MCNC: 9.2 MG/DL (ref 8.8–10.2)
CHLORIDE SERPL-SCNC: 105 MMOL/L (ref 98–107)
CHOLEST SERPL-MCNC: 166 MG/DL (ref 0–200)
CO2 SERPL-SCNC: 27 MMOL/L (ref 20–28)
COLOR UR: NORMAL
CREAT SERPL-MCNC: 0.87 MG/DL (ref 0.8–1.3)
DIFFERENTIAL METHOD BLD: ABNORMAL
EOSINOPHIL # BLD: 0.5 K/UL (ref 0–0.8)
EOSINOPHIL NFR BLD: 8 % (ref 0.5–7.8)
EPI CELLS #/AREA URNS HPF: NORMAL /HPF (ref 0–5)
ERYTHROCYTE [DISTWIDTH] IN BLOOD BY AUTOMATED COUNT: 12.4 % (ref 11.9–14.6)
GLOBULIN SER CALC-MCNC: 2.5 G/DL (ref 2.3–3.5)
GLUCOSE SERPL-MCNC: 85 MG/DL (ref 70–99)
GLUCOSE UR STRIP.AUTO-MCNC: NEGATIVE MG/DL
HBV SURFACE AB SERPL IA-ACNC: <3.5 MIU/ML
HCT VFR BLD AUTO: 41.6 % (ref 41.1–50.3)
HCV AB SER QL: REACTIVE
HDLC SERPL-MCNC: 52 MG/DL (ref 40–60)
HDLC SERPL: 3.2 (ref 0–5)
HGB BLD-MCNC: 14.3 G/DL (ref 13.6–17.2)
HGB UR QL STRIP: NEGATIVE
HIV 1+2 AB+HIV1 P24 AG SERPL QL IA: NONREACTIVE
HIV 1/2 RESULT COMMENT: NORMAL
HYALINE CASTS URNS QL MICRO: NORMAL /LPF
IMM GRANULOCYTES # BLD AUTO: 0 K/UL (ref 0–0.5)
IMM GRANULOCYTES NFR BLD AUTO: 0 % (ref 0–5)
KETONES UR QL STRIP.AUTO: NEGATIVE MG/DL
LDLC SERPL CALC-MCNC: 87 MG/DL (ref 0–100)
LEUKOCYTE ESTERASE UR QL STRIP.AUTO: NEGATIVE
LYMPHOCYTES # BLD: 2.3 K/UL (ref 0.5–4.6)
LYMPHOCYTES NFR BLD: 35 % (ref 13–44)
MCH RBC QN AUTO: 33.1 PG (ref 26.1–32.9)
MCHC RBC AUTO-ENTMCNC: 34.4 G/DL (ref 31.4–35)
MCV RBC AUTO: 96.3 FL (ref 82–102)
MONOCYTES # BLD: 0.6 K/UL (ref 0.1–1.3)
MONOCYTES NFR BLD: 10 % (ref 4–12)
MUCOUS THREADS URNS QL MICRO: 0 /LPF
NEUTS SEG # BLD: 3 K/UL (ref 1.7–8.2)
NEUTS SEG NFR BLD: 46 % (ref 43–78)
NITRITE UR QL STRIP.AUTO: NEGATIVE
NRBC # BLD: 0 K/UL (ref 0–0.2)
PH UR STRIP: 7.5 (ref 5–9)
PLATELET # BLD AUTO: 238 K/UL (ref 150–450)
PMV BLD AUTO: 11.8 FL (ref 9.4–12.3)
POTASSIUM SERPL-SCNC: 4.2 MMOL/L (ref 3.5–5.1)
PROT SERPL-MCNC: 6.6 G/DL (ref 6.3–8.2)
PROT UR STRIP-MCNC: NEGATIVE MG/DL
RBC # BLD AUTO: 4.32 M/UL (ref 4.23–5.6)
RBC #/AREA URNS HPF: NORMAL /HPF (ref 0–5)
SODIUM SERPL-SCNC: 141 MMOL/L (ref 136–145)
SP GR UR REFRACTOMETRY: 1.02 (ref 1–1.02)
TRIGL SERPL-MCNC: 135 MG/DL (ref 0–150)
TSH, 3RD GENERATION: 1.61 UIU/ML (ref 0.27–4.2)
URINE CULTURE IF INDICATED: NORMAL
UROBILINOGEN UR QL STRIP.AUTO: 0.2 EU/DL (ref 0.2–1)
VLDLC SERPL CALC-MCNC: 27 MG/DL (ref 6–23)
WBC # BLD AUTO: 6.5 K/UL (ref 4.3–11.1)
WBC URNS QL MICRO: NORMAL /HPF (ref 0–4)

## 2024-08-21 PROCEDURE — 99395 PREV VISIT EST AGE 18-39: CPT | Performed by: INTERNAL MEDICINE

## 2024-08-21 RX ORDER — LORAZEPAM 0.5 MG/1
0.5 TABLET ORAL EVERY 8 HOURS PRN
COMMUNITY
Start: 2024-03-01

## 2024-08-21 RX ORDER — DEXTROAMPHETAMINE SULFATE, DEXTROAMPHETAMINE SACCHARATE, AMPHETAMINE SULFATE AND AMPHETAMINE ASPARTATE 1.25; 1.25; 1.25; 1.25 MG/1; MG/1; MG/1; MG/1
5 CAPSULE, EXTENDED RELEASE ORAL EVERY MORNING
COMMUNITY
Start: 2024-01-01

## 2024-08-21 RX ORDER — DEXTROAMPHETAMINE SACCHARATE, AMPHETAMINE ASPARTATE, DEXTROAMPHETAMINE SULFATE AND AMPHETAMINE SULFATE 1.25; 1.25; 1.25; 1.25 MG/1; MG/1; MG/1; MG/1
5 TABLET ORAL DAILY
COMMUNITY
Start: 2024-06-25

## 2024-08-21 RX ORDER — SILDENAFIL 50 MG/1
50 TABLET, FILM COATED ORAL PRN
Qty: 30 TABLET | Refills: 3 | Status: SHIPPED | OUTPATIENT
Start: 2024-08-21

## 2024-08-21 RX ORDER — LURASIDONE HYDROCHLORIDE 20 MG/1
20 TABLET, FILM COATED ORAL DAILY
COMMUNITY
Start: 2024-03-01

## 2024-08-21 ASSESSMENT — PATIENT HEALTH QUESTIONNAIRE - PHQ9
10. IF YOU CHECKED OFF ANY PROBLEMS, HOW DIFFICULT HAVE THESE PROBLEMS MADE IT FOR YOU TO DO YOUR WORK, TAKE CARE OF THINGS AT HOME, OR GET ALONG WITH OTHER PEOPLE: SOMEWHAT DIFFICULT
6. FEELING BAD ABOUT YOURSELF - OR THAT YOU ARE A FAILURE OR HAVE LET YOURSELF OR YOUR FAMILY DOWN: NOT AT ALL
9. THOUGHTS THAT YOU WOULD BE BETTER OFF DEAD, OR OF HURTING YOURSELF: NOT AT ALL
SUM OF ALL RESPONSES TO PHQ QUESTIONS 1-9: 5
3. TROUBLE FALLING OR STAYING ASLEEP: SEVERAL DAYS
9. THOUGHTS THAT YOU WOULD BE BETTER OFF DEAD, OR OF HURTING YOURSELF: NOT AT ALL
SUM OF ALL RESPONSES TO PHQ QUESTIONS 1-9: 5
4. FEELING TIRED OR HAVING LITTLE ENERGY: SEVERAL DAYS
7. TROUBLE CONCENTRATING ON THINGS, SUCH AS READING THE NEWSPAPER OR WATCHING TELEVISION: SEVERAL DAYS
6. FEELING BAD ABOUT YOURSELF - OR THAT YOU ARE A FAILURE OR HAVE LET YOURSELF OR YOUR FAMILY DOWN: NOT AT ALL
5. POOR APPETITE OR OVEREATING: SEVERAL DAYS
1. LITTLE INTEREST OR PLEASURE IN DOING THINGS: SEVERAL DAYS
2. FEELING DOWN, DEPRESSED OR HOPELESS: NOT AT ALL
SUM OF ALL RESPONSES TO PHQ QUESTIONS 1-9: 5
SUM OF ALL RESPONSES TO PHQ9 QUESTIONS 1 & 2: 1
SUM OF ALL RESPONSES TO PHQ QUESTIONS 1-9: 5
10. IF YOU CHECKED OFF ANY PROBLEMS, HOW DIFFICULT HAVE THESE PROBLEMS MADE IT FOR YOU TO DO YOUR WORK, TAKE CARE OF THINGS AT HOME, OR GET ALONG WITH OTHER PEOPLE: SOMEWHAT DIFFICULT
3. TROUBLE FALLING OR STAYING ASLEEP: SEVERAL DAYS
7. TROUBLE CONCENTRATING ON THINGS, SUCH AS READING THE NEWSPAPER OR WATCHING TELEVISION: SEVERAL DAYS
1. LITTLE INTEREST OR PLEASURE IN DOING THINGS: SEVERAL DAYS
4. FEELING TIRED OR HAVING LITTLE ENERGY: SEVERAL DAYS
8. MOVING OR SPEAKING SO SLOWLY THAT OTHER PEOPLE COULD HAVE NOTICED. OR THE OPPOSITE, BEING SO FIGETY OR RESTLESS THAT YOU HAVE BEEN MOVING AROUND A LOT MORE THAN USUAL: NOT AT ALL
5. POOR APPETITE OR OVEREATING: SEVERAL DAYS
8. MOVING OR SPEAKING SO SLOWLY THAT OTHER PEOPLE COULD HAVE NOTICED. OR THE OPPOSITE - BEING SO FIDGETY OR RESTLESS THAT YOU HAVE BEEN MOVING AROUND A LOT MORE THAN USUAL: NOT AT ALL
2. FEELING DOWN, DEPRESSED OR HOPELESS: NOT AT ALL
SUM OF ALL RESPONSES TO PHQ QUESTIONS 1-9: 5

## 2024-08-21 ASSESSMENT — ENCOUNTER SYMPTOMS
CONSTIPATION: 0
ABDOMINAL PAIN: 0
STRIDOR: 0
CHEST TIGHTNESS: 0
COLOR CHANGE: 0
SHORTNESS OF BREATH: 0
ANAL BLEEDING: 0
DIARRHEA: 0
WHEEZING: 0
RECTAL PAIN: 0
CHOKING: 0
EYE DISCHARGE: 0
NAUSEA: 0
GASTROINTESTINAL NEGATIVE: 1
RHINORRHEA: 0
ALLERGIC/IMMUNOLOGIC NEGATIVE: 1
COUGH: 0
RESPIRATORY NEGATIVE: 1
EYE REDNESS: 0
BACK PAIN: 0
EYES NEGATIVE: 1
EYE PAIN: 0
ABDOMINAL DISTENTION: 0
BLOOD IN STOOL: 0
EYE ITCHING: 0
SINUS PRESSURE: 0

## 2024-08-21 NOTE — PATIENT INSTRUCTIONS
hands, brush your teeth twice a day, and wear a seat belt in the car.   Where can you learn more?  Go to [email or web address]://[email or web address].net/patientEd and enter P072 to learn more about \"Well Visit, Ages 18 to 65: Care Instructions.\"  Current as of: August 6, 2023  Content Version: 14.1  © 7928-5544 [employer]wise, [employer].   Care instructions adapted under license by [employer]. If you have questions about a medical condition or this instruction, always ask your healthcare professional. Healthwise, [employer] disclaims any warranty or liability for your use of this information.

## 2024-08-21 NOTE — PROGRESS NOTES
problem, joint swelling, myalgias, neck pain and neck stiffness.   Skin: Negative.  Negative for color change, pallor, rash and wound.   Allergic/Immunologic: Negative.  Negative for environmental allergies, food allergies and immunocompromised state.   Neurological: Negative.  Negative for dizziness, tremors, seizures, syncope, facial asymmetry, light-headedness, numbness and headaches.   Hematological: Negative.    Psychiatric/Behavioral:  Negative for agitation, behavioral problems, confusion, decreased concentration, dysphoric mood, hallucinations, sleep disturbance and suicidal ideas. The patient is not nervous/anxious.    All other systems reviewed and are negative.      Objective:    There were no vitals taken for this visit.    Physical Exam  Vitals and nursing note reviewed.   Constitutional:       General: Venkat is not in acute distress.     Appearance: Normal appearance. Donnai is not ill-appearing or toxic-appearing.   HENT:      Head: Normocephalic and atraumatic.      Right Ear: Tympanic membrane, ear canal and external ear normal.      Left Ear: Tympanic membrane, ear canal and external ear normal.   Eyes:      General: No scleral icterus.        Right eye: No discharge.         Left eye: No discharge.      Extraocular Movements: Extraocular movements intact.      Conjunctiva/sclera: Conjunctivae normal.      Pupils: Pupils are equal, round, and reactive to light.   Cardiovascular:      Rate and Rhythm: Normal rate and regular rhythm.      Pulses: Normal pulses.      Heart sounds: Normal heart sounds. No murmur heard.     No friction rub. No gallop.   Pulmonary:      Effort: Pulmonary effort is normal. No respiratory distress.      Breath sounds: Normal breath sounds. No stridor. No wheezing, rhonchi or rales.   Chest:      Chest wall: No tenderness.   Abdominal:      General: Abdomen is flat. Bowel sounds are normal. There is no distension.      Palpations: There is no mass.      Tenderness: There is

## 2024-08-22 ENCOUNTER — TELEMEDICINE (OUTPATIENT)
Dept: PRIMARY CARE CLINIC | Facility: CLINIC | Age: 29
End: 2024-08-22
Payer: COMMERCIAL

## 2024-08-22 DIAGNOSIS — R76.8 HEPATITIS C ANTIBODY TEST POSITIVE: ICD-10-CM

## 2024-08-22 DIAGNOSIS — R76.8 HEPATITIS C ANTIBODY TEST POSITIVE: Primary | ICD-10-CM

## 2024-08-22 LAB
HSV1 IGG SER IA-ACNC: 19.3 INDEX (ref 0–0.9)
HSV2 IGG SER IA-ACNC: <0.91 INDEX (ref 0–0.9)

## 2024-08-22 PROCEDURE — 99214 OFFICE O/P EST MOD 30 MIN: CPT | Performed by: INTERNAL MEDICINE
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
## 2024-08-23 LAB
C TRACH RRNA UR QL NAA+PROBE: NEGATIVE
M GENITALIUM DNA SPEC QL NAA+PROBE: NEGATIVE
N GONORRHOEA RRNA UR QL NAA+PROBE: NEGATIVE
SPECIMEN SOURCE: NORMAL
T PALLIDUM AB SER QL AGGL: NON REACTIVE

## 2024-08-24 LAB
HCV RNA SERPL NAA+PROBE-ACNC: NORMAL IU/ML
TEST INFORMATION: NORMAL

## 2024-08-26 ENCOUNTER — TELEMEDICINE (OUTPATIENT)
Dept: PRIMARY CARE CLINIC | Facility: CLINIC | Age: 29
End: 2024-08-26
Payer: COMMERCIAL

## 2024-08-26 DIAGNOSIS — R76.8 HEPATITIS C ANTIBODY TEST POSITIVE: ICD-10-CM

## 2024-08-26 DIAGNOSIS — Z11.3 SCREEN FOR STD (SEXUALLY TRANSMITTED DISEASE): ICD-10-CM

## 2024-08-26 DIAGNOSIS — F31.75 BIPOLAR DISORDER, IN PARTIAL REMISSION, MOST RECENT EPISODE DEPRESSED (HCC): Primary | ICD-10-CM

## 2024-08-26 LAB
HCV GENTYP SERPL NAA+PROBE: NORMAL
HCV RNA SERPL NAA+PROBE-ACNC: NORMAL IU/ML
HCV RNA SERPL NAA+PROBE-LOG IU: NORMAL LOG10 IU/ML
LABORATORY COMMENT REPORT: NORMAL

## 2024-08-26 PROCEDURE — 99214 OFFICE O/P EST MOD 30 MIN: CPT | Performed by: INTERNAL MEDICINE
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
## 2024-11-29 DIAGNOSIS — J45.40 MODERATE PERSISTENT ASTHMA WITHOUT COMPLICATION: ICD-10-CM

## 2024-12-02 RX ORDER — ALBUTEROL SULFATE 90 UG/1
2 INHALANT RESPIRATORY (INHALATION) EVERY 6 HOURS PRN
Qty: 8.5 EACH | Refills: 2 | Status: SHIPPED | OUTPATIENT
Start: 2024-12-02 | End: 2025-12-02

## 2025-01-14 DIAGNOSIS — R10.84 GENERALIZED ABDOMINAL PAIN: ICD-10-CM

## 2025-01-14 DIAGNOSIS — K21.9 GASTROESOPHAGEAL REFLUX DISEASE WITHOUT ESOPHAGITIS: ICD-10-CM

## 2025-01-14 RX ORDER — PANTOPRAZOLE SODIUM 40 MG/1
40 TABLET, DELAYED RELEASE ORAL
Qty: 90 TABLET | Refills: 0 | Status: SHIPPED | OUTPATIENT
Start: 2025-01-14

## 2025-03-26 RX ORDER — GABAPENTIN 100 MG/1
CAPSULE ORAL
COMMUNITY
Start: 2025-01-21 | End: 2025-03-27 | Stop reason: CLARIF

## 2025-03-27 ENCOUNTER — OFFICE VISIT (OUTPATIENT)
Dept: PRIMARY CARE CLINIC | Facility: CLINIC | Age: 30
End: 2025-03-27
Payer: COMMERCIAL

## 2025-03-27 VITALS
DIASTOLIC BLOOD PRESSURE: 66 MMHG | BODY MASS INDEX: 25.07 KG/M2 | OXYGEN SATURATION: 99 % | HEIGHT: 66 IN | SYSTOLIC BLOOD PRESSURE: 108 MMHG | WEIGHT: 156 LBS | HEART RATE: 69 BPM

## 2025-03-27 DIAGNOSIS — Z11.3 SCREEN FOR STD (SEXUALLY TRANSMITTED DISEASE): ICD-10-CM

## 2025-03-27 DIAGNOSIS — Z71.1 CONCERN ABOUT STD IN MALE WITHOUT DIAGNOSIS: ICD-10-CM

## 2025-03-27 DIAGNOSIS — F31.75 BIPOLAR DISORDER, IN PARTIAL REMISSION, MOST RECENT EPISODE DEPRESSED (HCC): Primary | ICD-10-CM

## 2025-03-27 DIAGNOSIS — K21.9 GASTROESOPHAGEAL REFLUX DISEASE WITHOUT ESOPHAGITIS: ICD-10-CM

## 2025-03-27 DIAGNOSIS — R10.84 GENERALIZED ABDOMINAL PAIN: ICD-10-CM

## 2025-03-27 DIAGNOSIS — G47.9 SLEEP DISORDER: ICD-10-CM

## 2025-03-27 DIAGNOSIS — J45.40 MODERATE PERSISTENT ASTHMA WITHOUT COMPLICATION: ICD-10-CM

## 2025-03-27 PROBLEM — F41.9 ANXIETY: Status: ACTIVE | Noted: 2025-03-27

## 2025-03-27 LAB
ALBUMIN SERPL-MCNC: 4 G/DL (ref 3.5–5)
ALBUMIN/GLOB SERPL: 1.5 (ref 1–1.9)
ALP SERPL-CCNC: 77 U/L (ref 40–129)
ALT SERPL-CCNC: 22 U/L (ref 8–55)
ANION GAP SERPL CALC-SCNC: 10 MMOL/L (ref 7–16)
AST SERPL-CCNC: 27 U/L (ref 15–37)
BILIRUB SERPL-MCNC: 0.3 MG/DL (ref 0–1.2)
BUN SERPL-MCNC: 13 MG/DL (ref 6–23)
CALCIUM SERPL-MCNC: 9.1 MG/DL (ref 8.8–10.2)
CHLORIDE SERPL-SCNC: 103 MMOL/L (ref 98–107)
CO2 SERPL-SCNC: 26 MMOL/L (ref 20–29)
CREAT SERPL-MCNC: 0.98 MG/DL (ref 0.8–1.3)
GLOBULIN SER CALC-MCNC: 2.7 G/DL (ref 2.3–3.5)
GLUCOSE SERPL-MCNC: 87 MG/DL (ref 70–99)
HIV 1+2 AB+HIV1 P24 AG SERPL QL IA: NONREACTIVE
HIV 1/2 RESULT COMMENT: NORMAL
POTASSIUM SERPL-SCNC: 4 MMOL/L (ref 3.5–5.1)
PROT SERPL-MCNC: 6.7 G/DL (ref 6.3–8.2)
SODIUM SERPL-SCNC: 139 MMOL/L (ref 136–145)

## 2025-03-27 PROCEDURE — 99214 OFFICE O/P EST MOD 30 MIN: CPT | Performed by: INTERNAL MEDICINE

## 2025-03-27 RX ORDER — EMTRICITABINE AND TENOFOVIR DISOPROXIL FUMARATE 200; 300 MG/1; MG/1
1 TABLET, FILM COATED ORAL DAILY
Qty: 90 TABLET | Refills: 0 | Status: SHIPPED | OUTPATIENT
Start: 2025-03-27

## 2025-03-27 RX ORDER — PANTOPRAZOLE SODIUM 40 MG/1
40 TABLET, DELAYED RELEASE ORAL
Qty: 90 TABLET | Refills: 1 | Status: SHIPPED | OUTPATIENT
Start: 2025-03-27

## 2025-03-27 SDOH — ECONOMIC STABILITY: FOOD INSECURITY: WITHIN THE PAST 12 MONTHS, YOU WORRIED THAT YOUR FOOD WOULD RUN OUT BEFORE YOU GOT MONEY TO BUY MORE.: NEVER TRUE

## 2025-03-27 SDOH — ECONOMIC STABILITY: FOOD INSECURITY: WITHIN THE PAST 12 MONTHS, THE FOOD YOU BOUGHT JUST DIDN'T LAST AND YOU DIDN'T HAVE MONEY TO GET MORE.: NEVER TRUE

## 2025-03-27 ASSESSMENT — PATIENT HEALTH QUESTIONNAIRE - PHQ9
SUM OF ALL RESPONSES TO PHQ QUESTIONS 1-9: 3
1. LITTLE INTEREST OR PLEASURE IN DOING THINGS: NOT AT ALL
4. FEELING TIRED OR HAVING LITTLE ENERGY: NOT AT ALL
8. MOVING OR SPEAKING SO SLOWLY THAT OTHER PEOPLE COULD HAVE NOTICED. OR THE OPPOSITE, BEING SO FIGETY OR RESTLESS THAT YOU HAVE BEEN MOVING AROUND A LOT MORE THAN USUAL: NOT AT ALL
10. IF YOU CHECKED OFF ANY PROBLEMS, HOW DIFFICULT HAVE THESE PROBLEMS MADE IT FOR YOU TO DO YOUR WORK, TAKE CARE OF THINGS AT HOME, OR GET ALONG WITH OTHER PEOPLE: NOT DIFFICULT AT ALL
SUM OF ALL RESPONSES TO PHQ QUESTIONS 1-9: 3
7. TROUBLE CONCENTRATING ON THINGS, SUCH AS READING THE NEWSPAPER OR WATCHING TELEVISION: NOT AT ALL
5. POOR APPETITE OR OVEREATING: NOT AT ALL
2. FEELING DOWN, DEPRESSED OR HOPELESS: NOT AT ALL
9. THOUGHTS THAT YOU WOULD BE BETTER OFF DEAD, OR OF HURTING YOURSELF: NOT AT ALL
3. TROUBLE FALLING OR STAYING ASLEEP: NEARLY EVERY DAY
6. FEELING BAD ABOUT YOURSELF - OR THAT YOU ARE A FAILURE OR HAVE LET YOURSELF OR YOUR FAMILY DOWN: NOT AT ALL

## 2025-03-27 ASSESSMENT — ENCOUNTER SYMPTOMS
GASTROINTESTINAL NEGATIVE: 1
RESPIRATORY NEGATIVE: 1

## 2025-03-27 NOTE — PROGRESS NOTES
file.    Social History     Socioeconomic History    Marital status: Life Partner     Spouse name: Not on file    Number of children: Not on file    Years of education: Not on file    Highest education level: Not on file   Occupational History    Not on file   Tobacco Use    Smoking status: Never    Smokeless tobacco: Never   Vaping Use    Vaping status: Every Day   Substance and Sexual Activity    Alcohol use: Yes    Drug use: Yes     Types: Marijuana (Weed)    Sexual activity: Not on file   Other Topics Concern    Not on file   Social History Narrative    Not on file     Social Drivers of Health     Financial Resource Strain: Not on file   Food Insecurity: Not on file (1/3/2024)   Transportation Needs: Not on file   Physical Activity: Not on file   Stress: Not on file   Social Connections: Unknown (1/19/2023)    Received from [facility]    Social Connections     Frequency of Communication with Friends and Family: Not asked     Frequency of Social Gatherings with Friends and Family: Not asked   Intimate Partner Violence: Unknown (1/19/2023)    Received from [facility]    Intimate Partner Violence     Fear of Current or Ex-Partner: Not asked     Emotionally Abused: Not asked     Physically Abused: Not asked     Sexually Abused: Not asked   Housing Stability: Not At Risk (3/10/2022)    Received from [facility] [facility]    Housing Stability     Was there a time when you did not have a steady place to sleep: Unrecognized value     Worried that the place you are staying is making you sick: Unrecognized value       Current Outpatient Medications   Medication Sig Dispense Refill    pantoprazole (PROTONIX) 40 MG tablet Take 1 tablet by mouth every morning (before breakfast) 90 tablet 1    emtricitabine-tenofovir (TRUVADA) 200-300 MG per tablet Take 1 tablet by mouth daily 90 tablet 0    albuterol sulfate HFA (PROVENTIL;VENTOLIN;PROAIR) 108 (90 Base) MCG/ACT inhaler INHALE 2

## 2025-03-28 ENCOUNTER — RESULTS FOLLOW-UP (OUTPATIENT)
Dept: PRIMARY CARE CLINIC | Facility: CLINIC | Age: 30
End: 2025-03-28

## 2025-04-23 ENCOUNTER — TELEMEDICINE (OUTPATIENT)
Dept: PRIMARY CARE CLINIC | Facility: CLINIC | Age: 30
End: 2025-04-23
Payer: COMMERCIAL

## 2025-04-23 DIAGNOSIS — S69.90XA FINGER INJURY, UNSPECIFIED LATERALITY, INITIAL ENCOUNTER: Primary | ICD-10-CM

## 2025-04-23 PROCEDURE — 99214 OFFICE O/P EST MOD 30 MIN: CPT | Performed by: INTERNAL MEDICINE

## 2025-04-23 RX ORDER — DOXYCYCLINE HYCLATE 100 MG
100 TABLET ORAL 2 TIMES DAILY
Qty: 20 TABLET | Refills: 0 | Status: SHIPPED | OUTPATIENT
Start: 2025-04-23 | End: 2025-05-03
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
## 2025-04-30 ENCOUNTER — TELEMEDICINE (OUTPATIENT)
Dept: PRIMARY CARE CLINIC | Facility: CLINIC | Age: 30
End: 2025-04-30
Payer: COMMERCIAL

## 2025-04-30 DIAGNOSIS — Z71.1 CONCERN ABOUT STD IN MALE WITHOUT DIAGNOSIS: ICD-10-CM

## 2025-04-30 DIAGNOSIS — K21.9 GASTROESOPHAGEAL REFLUX DISEASE WITHOUT ESOPHAGITIS: ICD-10-CM

## 2025-04-30 DIAGNOSIS — F31.75 BIPOLAR DISORDER, IN PARTIAL REMISSION, MOST RECENT EPISODE DEPRESSED (HCC): Primary | ICD-10-CM

## 2025-04-30 PROCEDURE — 99214 OFFICE O/P EST MOD 30 MIN: CPT | Performed by: INTERNAL MEDICINE
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
## 2025-07-30 ENCOUNTER — OFFICE VISIT (OUTPATIENT)
Dept: SLEEP MEDICINE | Age: 30
End: 2025-07-30
Payer: COMMERCIAL

## 2025-07-30 VITALS
OXYGEN SATURATION: 98 % | SYSTOLIC BLOOD PRESSURE: 111 MMHG | BODY MASS INDEX: 23.95 KG/M2 | DIASTOLIC BLOOD PRESSURE: 70 MMHG | HEART RATE: 67 BPM | WEIGHT: 149 LBS | HEIGHT: 66 IN

## 2025-07-30 DIAGNOSIS — G47.61 PERIODIC LIMB MOVEMENT DISORDER (PLMD): ICD-10-CM

## 2025-07-30 DIAGNOSIS — R29.818 SUSPECTED SLEEP APNEA: Primary | ICD-10-CM

## 2025-07-30 DIAGNOSIS — G47.00 FREQUENT NOCTURNAL AWAKENING: ICD-10-CM

## 2025-07-30 DIAGNOSIS — G47.00 PERSISTENT DISORDER OF INITIATING OR MAINTAINING SLEEP: ICD-10-CM

## 2025-07-30 DIAGNOSIS — R06.81 WITNESSED APNEIC SPELLS: ICD-10-CM

## 2025-07-30 DIAGNOSIS — G47.59 OTHER PARASOMNIA: ICD-10-CM

## 2025-07-30 DIAGNOSIS — R06.83 SNORING: ICD-10-CM

## 2025-07-30 DIAGNOSIS — G47.10 HYPERSOMNOLENCE: ICD-10-CM

## 2025-07-30 PROCEDURE — 99203 OFFICE O/P NEW LOW 30 MIN: CPT | Performed by: NURSE PRACTITIONER

## 2025-07-30 RX ORDER — OMEPRAZOLE 40 MG/1
40 CAPSULE, DELAYED RELEASE ORAL DAILY
COMMUNITY

## 2025-07-30 ASSESSMENT — SLEEP AND FATIGUE QUESTIONNAIRES
HOW LIKELY ARE YOU TO NOD OFF OR FALL ASLEEP WHILE SITTING INACTIVE IN A PUBLIC PLACE: WOULD NEVER DOZE
HOW LIKELY ARE YOU TO NOD OFF OR FALL ASLEEP WHEN YOU ARE A PASSENGER IN A CAR FOR AN HOUR WITHOUT A BREAK: SLIGHT CHANCE OF DOZING
HOW LIKELY ARE YOU TO NOD OFF OR FALL ASLEEP WHILE LYING DOWN TO REST IN THE AFTERNOON WHEN CIRCUMSTANCES PERMIT: MODERATE CHANCE OF DOZING
HOW LIKELY ARE YOU TO NOD OFF OR FALL ASLEEP WHILE SITTING QUIETLY AFTER LUNCH WITHOUT ALCOHOL: WOULD NEVER DOZE
HOW LIKELY ARE YOU TO NOD OFF OR FALL ASLEEP WHILE SITTING AND READING: MODERATE CHANCE OF DOZING
HOW LIKELY ARE YOU TO NOD OFF OR FALL ASLEEP IN A CAR, WHILE STOPPED FOR A FEW MINUTES IN TRAFFIC: WOULD NEVER DOZE
HOW LIKELY ARE YOU TO NOD OFF OR FALL ASLEEP WHILE WATCHING TV: HIGH CHANCE OF DOZING
HOW LIKELY ARE YOU TO NOD OFF OR FALL ASLEEP WHILE SITTING AND READING: MODERATE CHANCE OF DOZING
HOW LIKELY ARE YOU TO NOD OFF OR FALL ASLEEP WHILE SITTING AND TALKING TO SOMEONE: WOULD NEVER DOZE
HOW LIKELY ARE YOU TO NOD OFF OR FALL ASLEEP WHILE SITTING INACTIVE IN A PUBLIC PLACE: WOULD NEVER DOZE
HOW LIKELY ARE YOU TO NOD OFF OR FALL ASLEEP WHILE SITTING AND TALKING TO SOMEONE: WOULD NEVER DOZE
HOW LIKELY ARE YOU TO NOD OFF OR FALL ASLEEP IN A CAR, WHILE STOPPED FOR A FEW MINUTES IN TRAFFIC: WOULD NEVER DOZE
HOW LIKELY ARE YOU TO NOD OFF OR FALL ASLEEP WHEN YOU ARE A PASSENGER IN A CAR FOR AN HOUR WITHOUT A BREAK: SLIGHT CHANCE OF DOZING
ESS TOTAL SCORE: 8
HOW LIKELY ARE YOU TO NOD OFF OR FALL ASLEEP WHILE LYING DOWN TO REST IN THE AFTERNOON WHEN CIRCUMSTANCES PERMIT: MODERATE CHANCE OF DOZING
HOW LIKELY ARE YOU TO NOD OFF OR FALL ASLEEP WHILE SITTING QUIETLY AFTER LUNCH WITHOUT ALCOHOL: WOULD NEVER DOZE
HOW LIKELY ARE YOU TO NOD OFF OR FALL ASLEEP WHILE WATCHING TV: HIGH CHANCE OF DOZING

## 2025-07-30 ASSESSMENT — ENCOUNTER SYMPTOMS
VOICE CHANGE: 0
DIARRHEA: 0
NAUSEA: 0
EYE REDNESS: 0
EYE PAIN: 0
ABDOMINAL PAIN: 0
SHORTNESS OF BREATH: 0
SORE THROAT: 1
VOMITING: 0
BLOOD IN STOOL: 0
APNEA: 0
ANAL BLEEDING: 0

## 2025-07-30 NOTE — PROGRESS NOTES
HOURS AS NEEDED FOR SHORTNESS OF BREATH    ADDERALL XR 5 MG capsule Take 2 capsules by mouth every morning.    amphetamine-dextroamphetamine (ADDERALL) 5 MG tablet Take 2 tablets by mouth daily.    hydrocortisone 2.5 % cream Apply topically 2 times daily as needed    sildenafil (VIAGRA) 50 MG tablet Take 1 tablet by mouth as needed for Erectile Dysfunction    montelukast (SINGULAIR) 10 MG tablet Take 1 tablet by mouth nightly (Patient taking differently: Take 1 tablet by mouth as needed)    ATIVAN 0.5 MG tablet Take 1 tablet by mouth every 8 hours as needed. (Patient not taking: Reported on 7/30/2025)     No current facility-administered medications for this visit.         PHYSICAL EXAM:    Vitals:    07/30/25 1415   BP: 111/70   Pulse: 67   SpO2: 98%       Physical Exam  Vitals reviewed.   Constitutional:       General: Lolli is not in acute distress.     Appearance: Lolli is not ill-appearing.   HENT:      Head: Normocephalic and atraumatic.      Jaw: Malocclusion (overbite) present.      Nose: Nose normal.      Right Nostril: No epistaxis.      Left Nostril: No epistaxis.      Right Sinus: No maxillary sinus tenderness or frontal sinus tenderness.      Left Sinus: No maxillary sinus tenderness or frontal sinus tenderness.      Comments: Bilat nares patent     Mouth/Throat:      Lips: Pink.      Mouth: Mucous membranes are moist.      Dentition: Abnormal dentition (upper partial for laterals and two molars).      Palate: No mass and lesions.      Pharynx: Oropharynx is clear. Uvula midline.   Eyes:      Extraocular Movements: Extraocular movements intact.      Conjunctiva/sclera: Conjunctivae normal.   Neck:      Thyroid: No thyromegaly.      Vascular: No JVD.      Trachea: Trachea normal. No tracheal deviation.   Cardiovascular:      Rate and Rhythm: Normal rate and regular rhythm.      Pulses:           Radial pulses are 2+ on the right side and 2+ on the left side.      Heart sounds: Normal heart sounds.

## 2025-07-30 NOTE — PATIENT INSTRUCTIONS
You will get a call from [facility] to schedule your at-home sleep study in the next 24-48 hours.  This device will be mailed to you. It is a 2 night sleep study and once completed, you will return device to address provided.      A physician will read the study and you will receive a call from our office with results or to schedule a follow-up appointment with the Sleep Center to discuss treatment options.   It usually takes about 6 weeks to receive results.    May start magnesium glycinate 200-400 mg nightly to help with sleep maintenance.         Learning About Sleeping Well  What does sleeping well mean?    Sleeping well means getting enough sleep. How much sleep is enough varies among people.  The number of hours you sleep is not as important as how you feel when you wake up. If you do not feel refreshed, you probably need more sleep. Another sign of not getting enough sleep is feeling tired during the day.  The average total nightly sleep time is 7½ to 8 hours. Healthy adults may need a little more or a little less than this.  Why is getting enough sleep important?  Getting enough quality sleep is a basic part of good health. When your sleep suffers, your mood and your thoughts can suffer too. You may find yourself feeling more grumpy or stressed. Not getting enough sleep also can lead to serious problems, including injury, accidents, anxiety, and depression.  What might cause poor sleeping?  Many things can cause sleep problems, including:  Stress. Stress can be caused by fear about a single event, such as giving a speech. Or you may have ongoing stress, such as worry about work or school.  Depression, anxiety, and other mental or emotional conditions.  Changes in your sleep habits or surroundings. This includes changes that happen where you sleep, such as noise, light, or sleeping in a different bed. It also includes changes in your sleep pattern, such as having jet lag or working a late shift.  Health